# Patient Record
Sex: MALE | Race: WHITE | NOT HISPANIC OR LATINO | Employment: FULL TIME | URBAN - METROPOLITAN AREA
[De-identification: names, ages, dates, MRNs, and addresses within clinical notes are randomized per-mention and may not be internally consistent; named-entity substitution may affect disease eponyms.]

---

## 2022-09-12 ENCOUNTER — CONSULT (OUTPATIENT)
Dept: GASTROENTEROLOGY | Facility: CLINIC | Age: 37
End: 2022-09-12
Payer: COMMERCIAL

## 2022-09-12 ENCOUNTER — TELEPHONE (OUTPATIENT)
Dept: GASTROENTEROLOGY | Facility: CLINIC | Age: 37
End: 2022-09-12

## 2022-09-12 VITALS
WEIGHT: 221.2 LBS | DIASTOLIC BLOOD PRESSURE: 86 MMHG | BODY MASS INDEX: 29.96 KG/M2 | SYSTOLIC BLOOD PRESSURE: 128 MMHG | HEIGHT: 72 IN

## 2022-09-12 DIAGNOSIS — K21.9 GASTROESOPHAGEAL REFLUX DISEASE, UNSPECIFIED WHETHER ESOPHAGITIS PRESENT: ICD-10-CM

## 2022-09-12 DIAGNOSIS — R10.13 EPIGASTRIC PAIN: Primary | ICD-10-CM

## 2022-09-12 DIAGNOSIS — R10.11 RIGHT UPPER QUADRANT ABDOMINAL PAIN: ICD-10-CM

## 2022-09-12 PROCEDURE — 99203 OFFICE O/P NEW LOW 30 MIN: CPT | Performed by: NURSE PRACTITIONER

## 2022-09-12 RX ORDER — OMEPRAZOLE 20 MG/1
20 CAPSULE, DELAYED RELEASE ORAL DAILY
COMMUNITY
End: 2022-09-12 | Stop reason: ALTCHOICE

## 2022-09-12 RX ORDER — FAMOTIDINE 20 MG/1
20 TABLET, FILM COATED ORAL 2 TIMES DAILY
Qty: 30 TABLET | Refills: 6 | Status: SHIPPED | OUTPATIENT
Start: 2022-09-12 | End: 2022-10-03 | Stop reason: SDUPTHER

## 2022-09-12 RX ORDER — CALCIUM CARBONATE 750 MG/1
1 TABLET, CHEWABLE ORAL DAILY
COMMUNITY

## 2022-09-12 RX ORDER — PANTOPRAZOLE SODIUM 40 MG/1
40 TABLET, DELAYED RELEASE ORAL 2 TIMES DAILY WITH MEALS
COMMUNITY
Start: 2022-08-30 | End: 2022-10-03 | Stop reason: SDUPTHER

## 2022-09-12 NOTE — TELEPHONE ENCOUNTER
Scheduled date of EGD(as of today):10/03/22  Physician performing EGD:Dr Apollo Murray  Location of EGD:[facility]  Instructions reviewed with patient by:Dacia Jones Oats  Clearances: No

## 2022-09-12 NOTE — PROGRESS NOTES
[address] [facility] - Outpatient Consultation  Angeline Kee 30 y o  male MRN: [id number]  Encounter: [id number]    ASSESSMENT AND PLAN:      1  Epigastric pain  2  Right upper quadrant abdominal pain  3  Gastroesophageal reflux disease, unspecified whether esophagitis present  Patient referred by PCP for workup for possible gastritis  Patient had been prescribed pantoprazole 40 mg daily and omeprazole 20 mg daily  Patient states since starting with the medications his epigastric pain is much improved however states he continues with right upper quadrant pain, usually when he wakes up in the morning and during a m  Hours  Discussed increasing pantoprazole to 40 mg twice daily and stopping omeprazole as well as initiating Pepcid 20 mg HS  Consider GERD, functional dyspepsia, peptic versus gastric ulcer, gastritis, biliary dyskinesia  - CBC and differential; Future  - Comprehensive metabolic panel  - US right upper quadrant; Future  - CBC and differential  - EGD schedule at [facility])  - famotidine (PEPCID) 20 mg tablet; Take 1 tablet (20 mg total) by mouth 2 (two) times a day  Dispense: 30 tablet; Refill: 6      Followup Appointment:  3-4 weeks after procedure  ______________________________________________________________________    Chief Complaint   Patient presents with    Gastritis       HPI:   Angeline Kee is a 30y o  year old male referred by PCP for possible gastritis presents with epigastric and right upper quadrant abdominal discomfort  Patient states since starting on pantoprazole and omeprazole epigastric pain has resolved however states he does have occasional right upper quadrant pain which is routinely increased when he wakes up and in the a m  Hours  Patient denies nausea or vomiting  States his acid reflux symptoms are better since on meds  States he is having daily normal bowel movements  Denies hematochezia or melena  Nonsmoker, occasional ETOH use    Patient has not had a colonoscopy or EGD to date  Historical Information   Past Medical History:   Diagnosis Date    Gastritis      No past surgical history on file  Social History     Substance and Sexual Activity   Alcohol Use Yes    Comment: occasionally     Social History     Substance and Sexual Activity   Drug Use Never     Social History     Tobacco Use   Smoking Status Never Smoker   Smokeless Tobacco Never Used     Family History   Problem Relation Age of Onset    Anemia Mother     Esophageal cancer Father     No Known Problems Sister     Crohn's disease Paternal Uncle     Colon cancer Neg Hx     Colon polyps Neg Hx        Meds/Allergies     Current Outpatient Medications:     calcium carbonate (TUMS EX) 750 mg chewable tablet    famotidine (PEPCID) 20 mg tablet    pantoprazole (PROTONIX) 40 mg tablet    No Known Allergies    PHYSICAL EXAM:    Blood pressure 128/86, height 6' (1 829 m), weight 100 kg (221 lb 3 2 oz)  Body mass index is 30 kg/m²  General Appearance: NAD, cooperative, alert  Eyes: Anicteric, PERRLA, EOMI  ENT:  Normocephalic, atraumatic, normal mucosa  Neck:  Supple, symmetrical, trachea midline,   Resp:  Clear to auscultation bilaterally; no rales, rhonchi or wheezing; respirations unlabored   CV:  S1 S2, Regular rate and rhythm; no murmur, rub, or gallop  GI:  Soft, epigastric and right-sided abdominal discomfort with palpation, non-distended; normal bowel sounds; no masses, no organomegaly   Rectal: Deferred  Musculoskeletal: No cyanosis, clubbing or edema  Normal ROM    Skin:  No jaundice, rashes, or lesions   Heme/Lymph: No palpable cervical lymphadenopathy  Psych: Normal affect, good eye contact  Neuro: No gross deficits, AAOx3    Lab Results:   No results found for: WBC, HGB, HCT, MCV, PLT  No results found for: NA, K, CL, CO2, ANIONGAP, BUN, CREATININE, GLUCOSE, GLUF, CALCIUM, CORRECTEDCA, AST, ALT, ALKPHOS, PROT, BILITOT, EGFR  No results found for: IRON, TIBC, FERRITIN  No results found for: LIPASE    Radiology Results:   No results found  REVIEW OF SYSTEMS:    CONSTITUTIONAL: Denies any fever, chills, rigors, and weight loss  HEENT: No earache or tinnitus  Denies hearing loss or visual disturbances  CARDIOVASCULAR: No chest pain or palpitations  RESPIRATORY: Denies any cough, hemoptysis, shortness of breath or dyspnea on exertion  GASTROINTESTINAL: As noted in the History of Present Illness  GENITOURINARY: No problems with urination  Denies any hematuria or dysuria  NEUROLOGIC: No dizziness or vertigo, denies headaches  MUSCULOSKELETAL: Denies any muscle or joint pain  SKIN: Denies skin rashes or itching  ENDOCRINE: Denies excessive thirst  Denies intolerance to heat or cold  PSYCHOSOCIAL: Denies depression or anxiety  Denies any recent memory loss

## 2022-09-12 NOTE — PATIENT INSTRUCTIONS
Food diary  20-25 g of fiber per day  MiraLax, adjust for bowel movement every day or every other day    Increase head of bed  No eating 2-3 hours prior to bedtime

## 2022-09-13 LAB
ALBUMIN SERPL-MCNC: 5 G/DL (ref 4–5)
ALBUMIN/GLOB SERPL: 2.2 {RATIO} (ref 1.2–2.2)
ALP SERPL-CCNC: 68 IU/L (ref 44–121)
ALT SERPL-CCNC: 18 IU/L (ref 0–44)
AST SERPL-CCNC: 22 IU/L (ref 0–40)
BASOPHILS # BLD AUTO: 0.1 X10E3/UL (ref 0–0.2)
BASOPHILS NFR BLD AUTO: 1 %
BILIRUB SERPL-MCNC: 0.5 MG/DL (ref 0–1.2)
BUN SERPL-MCNC: 11 MG/DL (ref 6–20)
BUN/CREAT SERPL: 11 (ref 9–20)
CALCIUM SERPL-MCNC: 9.8 MG/DL (ref 8.7–10.2)
CHLORIDE SERPL-SCNC: 102 MMOL/L (ref 96–106)
CO2 SERPL-SCNC: 26 MMOL/L (ref 20–29)
CREAT SERPL-MCNC: 0.99 MG/DL (ref 0.76–1.27)
EGFR: 102 ML/MIN/1.73
EOSINOPHIL # BLD AUTO: 0.2 X10E3/UL (ref 0–0.4)
EOSINOPHIL NFR BLD AUTO: 3 %
ERYTHROCYTE [DISTWIDTH] IN BLOOD BY AUTOMATED COUNT: 11.8 % (ref 11.6–15.4)
GLOBULIN SER-MCNC: 2.3 G/DL (ref 1.5–4.5)
GLUCOSE SERPL-MCNC: 80 MG/DL (ref 65–99)
HCT VFR BLD AUTO: 45.1 % (ref 37.5–51)
HGB BLD-MCNC: 15.8 G/DL (ref 13–17.7)
IMM GRANULOCYTES # BLD: 0 X10E3/UL (ref 0–0.1)
IMM GRANULOCYTES NFR BLD: 0 %
LYMPHOCYTES # BLD AUTO: 2.2 X10E3/UL (ref 0.7–3.1)
LYMPHOCYTES NFR BLD AUTO: 31 %
MCH RBC QN AUTO: 31.7 PG (ref 26.6–33)
MCHC RBC AUTO-ENTMCNC: 35 G/DL (ref 31.5–35.7)
MCV RBC AUTO: 91 FL (ref 79–97)
MONOCYTES # BLD AUTO: 0.9 X10E3/UL (ref 0.1–0.9)
MONOCYTES NFR BLD AUTO: 13 %
NEUTROPHILS # BLD AUTO: 3.6 X10E3/UL (ref 1.4–7)
NEUTROPHILS NFR BLD AUTO: 52 %
PLATELET # BLD AUTO: 267 X10E3/UL (ref 150–450)
POTASSIUM SERPL-SCNC: 4.6 MMOL/L (ref 3.5–5.2)
PROT SERPL-MCNC: 7.3 G/DL (ref 6–8.5)
RBC # BLD AUTO: 4.98 X10E6/UL (ref 4.14–5.8)
SODIUM SERPL-SCNC: 143 MMOL/L (ref 134–144)
WBC # BLD AUTO: 6.9 X10E3/UL (ref 3.4–10.8)

## 2022-09-14 ENCOUNTER — HOSPITAL ENCOUNTER (OUTPATIENT)
Dept: ULTRASOUND IMAGING | Facility: HOSPITAL | Age: 37
Discharge: HOME | End: 2022-09-14
Payer: COMMERCIAL

## 2022-09-14 DIAGNOSIS — R10.13 EPIGASTRIC PAIN: ICD-10-CM

## 2022-09-14 PROCEDURE — 76705 ECHO EXAM OF ABDOMEN: CPT

## 2022-09-29 ENCOUNTER — TELEPHONE (OUTPATIENT)
Dept: GASTROENTEROLOGY | Facility: AMBULATORY SURGERY CENTER | Age: 37
End: 2022-09-29

## 2022-10-03 ENCOUNTER — HOSPITAL ENCOUNTER (OUTPATIENT)
Dept: GASTROENTEROLOGY | Facility: AMBULATORY SURGERY CENTER | Age: 37
Discharge: HOME | End: 2022-10-03
Payer: COMMERCIAL

## 2022-10-03 ENCOUNTER — ANESTHESIA (OUTPATIENT)
Dept: GASTROENTEROLOGY | Facility: AMBULATORY SURGERY CENTER | Age: 37
End: 2022-10-03

## 2022-10-03 ENCOUNTER — ANESTHESIA EVENT (OUTPATIENT)
Dept: GASTROENTEROLOGY | Facility: AMBULATORY SURGERY CENTER | Age: 37
End: 2022-10-03

## 2022-10-03 VITALS
HEART RATE: 64 BPM | WEIGHT: 206 LBS | HEIGHT: 72 IN | RESPIRATION RATE: 21 BRPM | DIASTOLIC BLOOD PRESSURE: 79 MMHG | OXYGEN SATURATION: 98 % | BODY MASS INDEX: 27.9 KG/M2 | SYSTOLIC BLOOD PRESSURE: 132 MMHG | TEMPERATURE: 98.1 F

## 2022-10-03 DIAGNOSIS — K21.9 GASTROESOPHAGEAL REFLUX DISEASE, UNSPECIFIED WHETHER ESOPHAGITIS PRESENT: ICD-10-CM

## 2022-10-03 DIAGNOSIS — R10.13 EPIGASTRIC PAIN: ICD-10-CM

## 2022-10-03 DIAGNOSIS — K29.70 GASTRITIS DETERMINED BY ENDOSCOPY: Primary | ICD-10-CM

## 2022-10-03 PROCEDURE — 43239 EGD BIOPSY SINGLE/MULTIPLE: CPT | Performed by: INTERNAL MEDICINE

## 2022-10-03 PROCEDURE — 88305 TISSUE EXAM BY PATHOLOGIST: CPT | Performed by: SPECIALIST

## 2022-10-03 RX ORDER — FAMOTIDINE 20 MG/1
20 TABLET, FILM COATED ORAL
Qty: 30 TABLET | Refills: 6 | Status: SHIPPED | OUTPATIENT
Start: 2022-10-03

## 2022-10-03 RX ORDER — PANTOPRAZOLE SODIUM 40 MG/1
40 TABLET, DELAYED RELEASE ORAL DAILY
Qty: 30 TABLET | Refills: 0 | Status: SHIPPED | OUTPATIENT
Start: 2022-10-03

## 2022-10-03 RX ORDER — SODIUM CHLORIDE, SODIUM LACTATE, POTASSIUM CHLORIDE, CALCIUM CHLORIDE 600; 310; 30; 20 MG/100ML; MG/100ML; MG/100ML; MG/100ML
50 INJECTION, SOLUTION INTRAVENOUS CONTINUOUS
Status: DISCONTINUED | OUTPATIENT
Start: 2022-10-03 | End: 2022-10-07 | Stop reason: HOSPADM

## 2022-10-03 RX ORDER — PROPOFOL 10 MG/ML
INJECTION, EMULSION INTRAVENOUS AS NEEDED
Status: DISCONTINUED | OUTPATIENT
Start: 2022-10-03 | End: 2022-10-03

## 2022-10-03 RX ADMIN — PROPOFOL 50 MG: 10 INJECTION, EMULSION INTRAVENOUS at 14:30

## 2022-10-03 RX ADMIN — PROPOFOL 150 MG: 10 INJECTION, EMULSION INTRAVENOUS at 14:27

## 2022-10-03 RX ADMIN — SODIUM CHLORIDE, SODIUM LACTATE, POTASSIUM CHLORIDE, CALCIUM CHLORIDE 50 ML/HR: 600; 310; 30; 20 INJECTION, SOLUTION INTRAVENOUS at 13:54

## 2022-10-03 NOTE — H&P
History and Physical - [facility]  Ludivina Mcgraw 30 y o  male MRN: [id number]    HPI: Ludivina Mcgraw is a 30y o  year old male who presents for epigastric pain    REVIEW OF SYSTEMS: Per the HPI, and otherwise unremarkable  Historical Information   Past Medical History:   Diagnosis Date    Gastritis      Past Surgical History:   Procedure Laterality Date    WISDOM TOOTH EXTRACTION       Social History   Social History     Substance and Sexual Activity   Alcohol Use Not Currently    Comment: occasionally     Social History     Substance and Sexual Activity   Drug Use Never     Social History     Tobacco Use   Smoking Status Never Smoker   Smokeless Tobacco Never Used     Family History   Problem Relation Age of Onset    Anemia Mother     Esophageal cancer Father     No Known Problems Sister     Crohn's disease Paternal Uncle     Colon cancer Neg Hx     Colon polyps Neg Hx        Meds/Allergies       Current Outpatient Medications:     calcium carbonate (TUMS EX) 750 mg chewable tablet    famotidine (PEPCID) 20 mg tablet    pantoprazole (PROTONIX) 40 mg tablet    Current Facility-Administered Medications:     lactated ringers infusion, 50 mL/hr, Intravenous, Continuous, 50 mL/hr at 10/03/22 1354    No Known Allergies    Objective     /85   Pulse 75   Temp 98 1 °F (36 7 °C) (Temporal)   Resp 20   Ht 6' (1 829 m)   Wt 93 4 kg (206 lb)   SpO2 100%   BMI 27 94 kg/m²     PHYSICAL EXAM    Gen: NAD AAOx3  Head: Normocephalic, Atraumatic  CV: S1S2 RRR no m/r/g  CHEST: Clear b/l no c/r/w  ABD: soft, +BS NT/ND  EXT: no edema    ASSESSMENT/PLAN:  This is a 30y o  year old male here for EGD, and he is stable and optimized for his procedure

## 2022-10-03 NOTE — ANESTHESIA PREPROCEDURE EVALUATION
Procedure:  EGD    Relevant Problems   Digestive   (+) Gastritis        Physical Exam    Airway    Mallampati score: II  TM Distance: >3 FB  Neck ROM: full     Dental   No notable dental hx     Cardiovascular      Pulmonary      Other Findings        Anesthesia Plan  ASA Score- 2     Anesthesia Type- IV sedation with anesthesia with ASA Monitors  Additional Monitors:   Airway Plan:           Plan Factors-Exercise tolerance (METS): >4 METS  Chart reviewed  Patient summary reviewed  Patient is not a current smoker  Induction- intravenous  Postoperative Plan-     Informed Consent- Anesthetic plan and risks discussed with patient  I personally reviewed this patient with the CRNA  Discussed and agreed on the Anesthesia Plan with the MARIO ALBERTO Nieves

## 2022-10-03 NOTE — ANESTHESIA POSTPROCEDURE EVALUATION
Post-Op Assessment Note    CV Status:  Stable  Pain Score: 0    Pain management: adequate     Mental Status:  Alert and awake   Hydration Status:  Euvolemic   PONV Controlled:  Controlled   Airway Patency:  Patent      Post Op Vitals Reviewed: Yes      Staff: Anesthesiologist, CRNA         No complications documented      BP   135/90   Temp      Pulse  72   Resp   22   SpO2   98

## 2022-10-11 ENCOUNTER — TELEPHONE (OUTPATIENT)
Dept: GASTROENTEROLOGY | Facility: CLINIC | Age: 37
End: 2022-10-11

## 2022-10-11 NOTE — TELEPHONE ENCOUNTER
----- Message from Raul Thibodeaux RN sent at 10/11/2022  4:11 PM EDT -----  Regarding: FW: Indigestion    ----- Message -----  From: Rayna Langley  Sent: 10/11/2022   4:10 PM EDT  To: , #  Subject: Indigestion                                      Hi Conrad Grimaldo,  For the past week and a half, I have been experiencing what I believe is indigestion in my throat/neck area  Typically right after meals or shortly after there is a burning feeling in that area  I have not had any other stomach, abdominal, or chest discomfort since our visit in mid September  I am still on a very strict clean diet  I am still taking one 40mg pantoprazole in the morning and one 20mg famotidine before bed  Do you have any suggestions? Thank you    Kallie Queen

## 2022-10-11 NOTE — TELEPHONE ENCOUNTER
Spoke to the patient about his recent concerns for increased acid reflux symptoms  He states he has been taking pantoprazole 40 mg daily and Pepcid 20 mg HS  The results of the biopsies had not been called to the patient however the patient did say he did notice them in his MyChart as of today  Appears biopsies may be negative  Instructed patient he can increase his pantoprazole to 40 mg twice daily until he receives his phone call from Dr Jasmin Messina who conducted his EGD and ask what he can do moving forward  He would prefer to have minimum amount of medications however noted if he is on maximum coverage for acid reflux and continuing to have an symptoms his future would most likely have an EGD with [facility]  Patient does have follow-up appointment in the next few weeks at the office

## 2022-10-13 NOTE — RESULT ENCOUNTER NOTE
Discussed with patient, no EGD recall  Spoke with nurse practitioner this week about ongoing symptoms  Agree with twice daily PPI and anti-reflux diet  Keep upcoming office visit

## 2022-10-26 ENCOUNTER — OFFICE VISIT (OUTPATIENT)
Dept: GASTROENTEROLOGY | Facility: CLINIC | Age: 37
End: 2022-10-26
Payer: COMMERCIAL

## 2022-10-26 VITALS
HEIGHT: 72 IN | BODY MASS INDEX: 27.09 KG/M2 | SYSTOLIC BLOOD PRESSURE: 120 MMHG | DIASTOLIC BLOOD PRESSURE: 80 MMHG | WEIGHT: 200 LBS

## 2022-10-26 DIAGNOSIS — K21.9 GASTROESOPHAGEAL REFLUX DISEASE, UNSPECIFIED WHETHER ESOPHAGITIS PRESENT: ICD-10-CM

## 2022-10-26 DIAGNOSIS — K82.8 GALLBLADDER SLUDGE: ICD-10-CM

## 2022-10-26 DIAGNOSIS — R10.13 EPIGASTRIC ABDOMINAL PAIN: Primary | ICD-10-CM

## 2022-10-26 PROCEDURE — 99213 OFFICE O/P EST LOW 20 MIN: CPT | Performed by: INTERNAL MEDICINE

## 2022-11-05 DIAGNOSIS — K21.9 GASTROESOPHAGEAL REFLUX DISEASE, UNSPECIFIED WHETHER ESOPHAGITIS PRESENT: ICD-10-CM

## 2022-11-05 RX ORDER — FAMOTIDINE 20 MG/1
TABLET, FILM COATED ORAL
Qty: 90 TABLET | Refills: 3 | Status: SHIPPED | OUTPATIENT
Start: 2022-11-05

## 2022-11-17 ENCOUNTER — HOSPITAL ENCOUNTER (OUTPATIENT)
Dept: NUCLEAR MEDICINE | Facility: HOSPITAL | Age: 37
End: 2022-11-17
Attending: INTERNAL MEDICINE

## 2022-11-17 DIAGNOSIS — K21.9 GASTROESOPHAGEAL REFLUX DISEASE, UNSPECIFIED WHETHER ESOPHAGITIS PRESENT: Primary | ICD-10-CM

## 2022-11-17 DIAGNOSIS — R10.13 EPIGASTRIC ABDOMINAL PAIN: ICD-10-CM

## 2022-11-17 RX ORDER — OMEPRAZOLE 40 MG/1
40 CAPSULE, DELAYED RELEASE ORAL DAILY
Qty: 90 CAPSULE | Refills: 1 | Status: SHIPPED | OUTPATIENT
Start: 2022-11-17

## 2022-11-17 RX ADMIN — SINCALIDE 1.8 MCG: 5 INJECTION, POWDER, LYOPHILIZED, FOR SOLUTION INTRAVENOUS at 08:47

## 2023-08-25 ENCOUNTER — OFFICE VISIT (OUTPATIENT)
Age: 38
End: 2023-08-25
Payer: COMMERCIAL

## 2023-08-25 VITALS
BODY MASS INDEX: 23.92 KG/M2 | HEIGHT: 72 IN | SYSTOLIC BLOOD PRESSURE: 128 MMHG | WEIGHT: 176.6 LBS | DIASTOLIC BLOOD PRESSURE: 82 MMHG

## 2023-08-25 DIAGNOSIS — K21.9 GASTROESOPHAGEAL REFLUX DISEASE WITHOUT ESOPHAGITIS: Primary | ICD-10-CM

## 2023-08-25 PROCEDURE — 99214 OFFICE O/P EST MOD 30 MIN: CPT | Performed by: INTERNAL MEDICINE

## 2023-08-25 RX ORDER — ESOMEPRAZOLE MAGNESIUM 40 MG/1
40 CAPSULE, DELAYED RELEASE ORAL
Qty: 180 CAPSULE | Refills: 0 | Status: SHIPPED | OUTPATIENT
Start: 2023-08-25

## 2023-08-25 NOTE — PROGRESS NOTES
[facility] - Outpatient Follow-up Note  Jaime Vega 41 y.o. male MRN: [id number]  Encounter: [id number]    ASSESSMENT AND PLAN:      1. Gastroesophageal reflux disease without esophagitis  Due to the irregular Z-line found on endoscopy and his family history of esophageal adenocarcinoma in his father, I do believe he has reflux. His breakthrough symptoms on prior therapy happen at night, so I will prescribe Nexium 40 mg twice daily. We will reassess symptoms in 8 weeks, and if still symptomatic, I will proceed with pH impedance  - esomeprazole (NexIUM) 40 MG capsule; Take 1 capsule (40 mg total) by mouth 2 (two) times a day before meals  Dispense: 180 capsule; Refill: 0      Follow up appointment: 2 months  ______________________________________________________________________    Chief Complaint   Patient presents with   • Follow-up     Pt experienced acid reflux symptoms and meds worked for a while but he developed indigestion and gas. He also noted weight loss. Also feels an itching sensation in upper right abdomen after eating any fatty foods. He currently eats a very bland diet. Takes famotidine 20 mg as needed. HPI:   Patient is a 41 y.o. male with a significant PMH of family history of esophageal adenocarcinoma presenting for follow up regarding GERD. He has been given several antisecretory therapies over the last year or so that seemingly worked initially, but then he starts to develop breakthrough symptoms of belching and regurgitation. The symptoms of breakthrough usually occur at night. He has always been prescribed just daily PPI. At one point he was on famotidine at night, but it did not help. He denies dysphagia and early satiety as well as vomiting. He did have a work-up to rule out gallbladder disease, but this was normal.  He does not smoke. His endoscopy last October showed an irregular Z-line.   Biopsies of the esophagus and stomach were unremarkable. Historical Information   Past Medical History:   Diagnosis Date   • Gastritis      Past Surgical History:   Procedure Laterality Date   • WISDOM TOOTH EXTRACTION       Social History     Substance and Sexual Activity   Alcohol Use Not Currently    Comment: occasionally     Social History     Substance and Sexual Activity   Drug Use Never     Social History     Tobacco Use   Smoking Status Never   Smokeless Tobacco Never     Family History   Problem Relation Age of Onset   • Anemia Mother    • Esophageal cancer Father    • Cancer Father         Lower esophagus cancer   • No Known Problems Sister    • Crohn's disease Paternal Uncle    • Colon cancer Neg Hx    • Colon polyps Neg Hx          Current Outpatient Medications:   •  esomeprazole (NexIUM) 40 MG capsule  •  milk thistle 175 MG tablet  •  calcium carbonate (TUMS EX) 750 mg chewable tablet  No Known Allergies  Reviewed medications and allergies and updated as indicated    PHYSICAL EXAM:    Blood pressure 128/82, height 6' (1.829 m), weight 80.1 kg (176 lb 9.6 oz). Body mass index is 23.95 kg/m². General Appearance: NAD, cooperative, alert  Eyes: Anicteric  GI:  Soft, non-tender, non-distended; normal bowel sounds; no masses, no organomegaly   Rectal: Deferred  Musculoskeletal: No edema. Skin:  No jaundice    Lab Results:   Lab Results   Component Value Date    WBC 6.9 09/12/2022    HGB 15.8 09/12/2022    MCV 91 09/12/2022     09/12/2022     Lab Results   Component Value Date    K 4.6 09/12/2022     09/12/2022    CO2 26 09/12/2022    BUN 11 09/12/2022    CREATININE 0.99 09/12/2022    AST 22 09/12/2022    ALT 18 09/12/2022    EGFR 102 09/12/2022     No results found for: "IRON", "TIBC", "FERRITIN"  No results found for: "LIPASE"    Radiology Results:   No results found.

## 2023-10-25 ENCOUNTER — OFFICE VISIT (OUTPATIENT)
Age: 38
End: 2023-10-25
Payer: COMMERCIAL

## 2023-10-25 VITALS
HEIGHT: 72 IN | SYSTOLIC BLOOD PRESSURE: 128 MMHG | WEIGHT: 180.2 LBS | BODY MASS INDEX: 24.41 KG/M2 | DIASTOLIC BLOOD PRESSURE: 78 MMHG

## 2023-10-25 DIAGNOSIS — K21.9 GASTROESOPHAGEAL REFLUX DISEASE WITHOUT ESOPHAGITIS: Primary | ICD-10-CM

## 2023-10-25 PROCEDURE — 99214 OFFICE O/P EST MOD 30 MIN: CPT | Performed by: INTERNAL MEDICINE

## 2023-10-25 NOTE — PROGRESS NOTES
[facility] - Outpatient Follow-up Note  Jorge Patino 41 y.o. male MRN: [id number]  Encounter: [id number]    ASSESSMENT AND PLAN:      1. Gastroesophageal reflux disease without esophagitis  Patient is still experiencing belching and regurgitation and likely feeling nonacid reflux. I will study him with 24-hour pH impedance on his current therapy as well as esophageal manometry in anticipation of needing an antireflux surgery. I discussed in detail with him and his wife about his options including Nissen fundoplication, LINX, and TIF  - Espoh manometry/24hr ph; Future      Follow up appointment: For manometry and pH impedance  ______________________________________________________________________    Chief Complaint   Patient presents with    Follow-up     Pt is experiencing burning in his throat in the morning. He has a constant feeling he has to burp. Esomeprazole helped initial but no longer helping symptoms. Pt noted he is sleeping on an incline. HPI:   Patient is a 41 y.o. male with a significant PMH of GERD and family history of esophageal adenocarcinoma presenting for follow up regarding continued issues with belching and regurgitation. He initially did feel better on his esomeprazole 40 mg twice daily, and was asymptomatic for about 3 weeks, but his symptoms did return. His main symptom is regurgitation with some belching. He denies dysphagia and odynophagia. He is interested in an antireflux procedure.     Historical Information   Past Medical History:   Diagnosis Date    Gastritis     GERD (gastroesophageal reflux disease) Sep 2022     Past Surgical History:   Procedure Laterality Date    WISDOM TOOTH EXTRACTION       Social History     Substance and Sexual Activity   Alcohol Use Not Currently    Comment: occasionally     Social History     Substance and Sexual Activity   Drug Use Never     Social History     Tobacco Use   Smoking Status Never   Smokeless Tobacco Never     Family History   Problem Relation Age of Onset    Anemia Mother     Esophageal cancer Father     Cancer Father         Lower esophagus cancer    No Known Problems Sister     Crohn's disease Paternal Uncle     Colon cancer Neg Hx     Colon polyps Neg Hx          Current Outpatient Medications:     esomeprazole (NexIUM) 40 MG capsule    milk thistle 175 MG tablet    calcium carbonate (TUMS EX) 750 mg chewable tablet  No Known Allergies  Reviewed medications and allergies and updated as indicated    PHYSICAL EXAM:    Blood pressure 128/78, height 6' (1.829 m), weight 81.7 kg (180 lb 3.2 oz). Body mass index is 24.44 kg/m². General Appearance: NAD, cooperative, alert  Eyes: Anicteric  GI:  Soft, non-tender, non-distended; normal bowel sounds; no masses, no organomegaly   Rectal: Deferred  Musculoskeletal: No edema. Skin:  No jaundice    Lab Results:   Lab Results   Component Value Date    WBC 6.9 09/12/2022    HGB 15.8 09/12/2022    MCV 91 09/12/2022     09/12/2022     Lab Results   Component Value Date    K 4.6 09/12/2022     09/12/2022    CO2 26 09/12/2022    BUN 11 09/12/2022    CREATININE 0.99 09/12/2022    AST 22 09/12/2022    ALT 18 09/12/2022    EGFR 102 09/12/2022     No results found for: "IRON", "TIBC", "FERRITIN"  No results found for: "LIPASE"    Radiology Results:   No results found.

## 2023-11-07 ENCOUNTER — TELEPHONE (OUTPATIENT)
Dept: GASTROENTEROLOGY | Facility: AMBULATORY SURGERY CENTER | Age: 38
End: 2023-11-07

## 2023-11-09 ENCOUNTER — HOSPITAL ENCOUNTER (OUTPATIENT)
Dept: GASTROENTEROLOGY | Facility: AMBULATORY SURGERY CENTER | Age: 38
Discharge: HOME | End: 2023-11-09
Attending: INTERNAL MEDICINE

## 2023-11-09 VITALS
SYSTOLIC BLOOD PRESSURE: 133 MMHG | DIASTOLIC BLOOD PRESSURE: 80 MMHG | RESPIRATION RATE: 15 BRPM | OXYGEN SATURATION: 100 % | HEART RATE: 69 BPM

## 2023-11-09 DIAGNOSIS — K21.9 GASTROESOPHAGEAL REFLUX DISEASE WITHOUT ESOPHAGITIS: ICD-10-CM

## 2023-11-09 NOTE — PERIOPERATIVE NURSING NOTE
Patient brought in the room and explained the esophageal manometry procedure. After the confirmation of allergies, hurricaine one spray given via oral cavity  and  a transnasal insertion of the High Resolution esophageal manometry catheter was inserted via right nostril. Patient given water to drink during the insertion and once the catheter inserted pressure bands of both Upper esophageal sphincter  (UES) and Lower esophageal sphincter ( LES) were observed on the color contour. Patient instructed to take a deep breath to verify placement of the catheter, diaphragmatic pinch noted on inspiration. Catheter was secured to right cheek. Patient was assisted to supine position . Patient was instructed to relax  while acclimating the catheter for about 5 minutes. A 30 second baseline resting pressure was obtained to identify the UES and LES followed by a series of 10 liquid swallows using 5 cc room temperature normal saline to assess esophageal motility and bolus transit. Patient administered 10 viscous swallows using 5 cc viscous solution, 1 multiple rapid drink swallow using 2 cc room temperature normal saline given a total of 5 drinks. Patient instructed to sit up at the edge of the stretcher and given 5 upright liquid swallows using 5 cc room temperature normal saline and 1 rapid drink challenge using 200 cc room temperature water. At the end of the procedure the high resolution esophageal manometry catheter was removed from the nostril intact. PH probe inserted via right nostril and secured. Zephr recorder teachback performed and patient verbalized understanding. Patient instructed to return next day to have probe remove. Discharge instructions given and patient ambulated out of room in stable condition.

## 2023-11-15 DIAGNOSIS — K22.2 ESOPHAGOGASTRIC JUNCTION OUTFLOW OBSTRUCTION: Primary | ICD-10-CM

## 2023-12-01 ENCOUNTER — HOSPITAL ENCOUNTER (OUTPATIENT)
Dept: RADIOLOGY | Facility: HOSPITAL | Age: 38
End: 2023-12-01
Attending: INTERNAL MEDICINE
Payer: COMMERCIAL

## 2023-12-01 DIAGNOSIS — K22.2 ESOPHAGOGASTRIC JUNCTION OUTFLOW OBSTRUCTION: ICD-10-CM

## 2023-12-01 PROCEDURE — 74220 X-RAY XM ESOPHAGUS 1CNTRST: CPT

## 2023-12-22 ENCOUNTER — OFFICE VISIT (OUTPATIENT)
Age: 38
End: 2023-12-22
Payer: COMMERCIAL

## 2023-12-22 VITALS
DIASTOLIC BLOOD PRESSURE: 80 MMHG | SYSTOLIC BLOOD PRESSURE: 138 MMHG | HEIGHT: 72 IN | BODY MASS INDEX: 25.55 KG/M2 | WEIGHT: 188.6 LBS

## 2023-12-22 DIAGNOSIS — K22.2 ESOPHAGOGASTRIC JUNCTION OUTFLOW OBSTRUCTION: ICD-10-CM

## 2023-12-22 DIAGNOSIS — K21.9 GASTROESOPHAGEAL REFLUX DISEASE WITHOUT ESOPHAGITIS: Primary | ICD-10-CM

## 2023-12-22 PROCEDURE — 99214 OFFICE O/P EST MOD 30 MIN: CPT | Performed by: INTERNAL MEDICINE

## 2023-12-22 NOTE — PROGRESS NOTES
[facility] - Outpatient Follow-up Note  Tom Carrizales 38 y.o. male MRN: [id number]  Encounter: [id number]    ASSESSMENT AND PLAN:      1. Gastroesophageal reflux disease without esophagitis  Now that he is asymptomatic, I think this is more likely.  I have instructed him to decrease his Nexium to daily and the new year and keep me updated via the patient portal.  If his symptoms return or worsen, I will proceed with EGD with [facility] off therapy to prove that he has reflux disease before sending him to a surgeon    2. Esophagogastric junction outflow obstruction  Clinically irrelevant, as he has no dysphagia or chest pain, and his barium esophagram with tablet was completely normal      Follow up appointment: Via patient portal as above    _______________________      Chief Complaint   Patient presents with    Follow-up     Pt would like to discuss test results and treatment plan.        HPI:   Patient is a 38 y.o. male with a significant PMH of GERD presenting for follow up regarding reflux.  He had esophageal manometry that showed an EGJ outflow obstruction with normal esophageal motility, but barium esophagram with tablet was normal.  He denies dysphagia and odynophagia.  He actually feels quite well and has no symptoms.  He believes the twice a day Nexium has slowly taken care of everything and is quite happy.  His 24-hour pH impedance testing showed no acid reflux whatsoever while on Nexium twice daily.  His symptoms did not correlate at all with acid or nonacid reflux.    Historical Information   Past Medical History:   Diagnosis Date    Gastritis     GERD (gastroesophageal reflux disease) Sep 2022     Past Surgical History:   Procedure Laterality Date    WISDOM TOOTH EXTRACTION       Social History     Substance and Sexual Activity   Alcohol Use Not Currently    Comment: occasionally     Social History     Substance and Sexual Activity   Drug Use Never     Social History  "    Tobacco Use   Smoking Status Never    Passive exposure: Never   Smokeless Tobacco Never     Family History   Problem Relation Age of Onset    Anemia Mother     Esophageal cancer Father     Cancer Father         Lower esophagus cancer    No Known Problems Sister     Crohn's disease Paternal Uncle     Colon cancer Neg Hx     Colon polyps Neg Hx          Current Outpatient Medications:     esomeprazole (NexIUM) 40 MG capsule    calcium carbonate (TUMS EX) 750 mg chewable tablet    milk thistle 175 MG tablet  No Known Allergies  Reviewed medications and allergies and updated as indicated    PHYSICAL EXAM:    Blood pressure 138/80, height 6' (1.829 m), weight 85.5 kg (188 lb 9.6 oz). Body mass index is 25.58 kg/m².  General Appearance: NAD, cooperative, alert  Eyes: Anicteric  GI:  Soft, non-tender, non-distended; normal bowel sounds; no masses, no organomegaly   Rectal: Deferred  Musculoskeletal: No edema.  Skin:  No jaundice    Lab Results:   Lab Results   Component Value Date    WBC 6.9 09/12/2022    HGB 15.8 09/12/2022    MCV 91 09/12/2022     09/12/2022     Lab Results   Component Value Date    K 4.6 09/12/2022     09/12/2022    CO2 26 09/12/2022    BUN 11 09/12/2022    CREATININE 0.99 09/12/2022    AST 22 09/12/2022    ALT 18 09/12/2022    EGFR 102 09/12/2022     No results found for: \"IRON\", \"TIBC\", \"FERRITIN\"  No results found for: \"LIPASE\"    Radiology Results:   FL barium swallow    Result Date: 12/1/2023  Narrative: BARIUM SWALLOW-ESOPHAGRAM INDICATION:   K22.2: Esophageal obstruction. COMPARISON:  None IMAGES: 116 FLUOROSCOPY TIME:   0.8 minutes TECHNIQUE: The patient was given effervescent granules and barium by mouth and images of the esophagus were obtained. FINDINGS: The esophagus is normal in caliber.  Esophageal motility is normal and emptying of contrast from the esophagus is prompt.  No mucosal lesion, ulceration or evidence of fold thickening is seen. Gastroesophageal reflux was not " observed.  There is no hiatal hernia. Radiopaque pill was administered and passed freely through the esophagus into the stomach with no abnormal delay.     Impression: Unremarkable esophagram. Radiopaque pill was administered and passed freely through the esophagus into the stomach with no abnormal delay. Workstation performed: [id number]

## 2024-06-02 DIAGNOSIS — K21.9 GASTROESOPHAGEAL REFLUX DISEASE WITHOUT ESOPHAGITIS: ICD-10-CM

## 2024-06-03 RX ORDER — ESOMEPRAZOLE MAGNESIUM 40 MG/1
CAPSULE, DELAYED RELEASE ORAL
Qty: 180 CAPSULE | Refills: 1 | Status: SHIPPED | OUTPATIENT
Start: 2024-06-03

## 2024-12-15 DIAGNOSIS — K21.9 GASTROESOPHAGEAL REFLUX DISEASE WITHOUT ESOPHAGITIS: ICD-10-CM

## 2024-12-16 RX ORDER — ESOMEPRAZOLE MAGNESIUM 40 MG/1
CAPSULE, DELAYED RELEASE ORAL
Qty: 180 CAPSULE | Refills: 0 | Status: SHIPPED | OUTPATIENT
Start: 2024-12-16

## 2025-03-14 DIAGNOSIS — K21.9 GASTROESOPHAGEAL REFLUX DISEASE WITHOUT ESOPHAGITIS: ICD-10-CM

## 2025-03-14 RX ORDER — ESOMEPRAZOLE MAGNESIUM 40 MG/1
CAPSULE, DELAYED RELEASE ORAL
Qty: 180 CAPSULE | Refills: 0 | Status: SHIPPED | OUTPATIENT
Start: 2025-03-14

## 2025-03-26 ENCOUNTER — OFFICE VISIT (OUTPATIENT)
Age: 40
End: 2025-03-26
Payer: COMMERCIAL

## 2025-03-26 VITALS
BODY MASS INDEX: 29.26 KG/M2 | WEIGHT: 216 LBS | HEIGHT: 72 IN | DIASTOLIC BLOOD PRESSURE: 88 MMHG | SYSTOLIC BLOOD PRESSURE: 142 MMHG

## 2025-03-26 DIAGNOSIS — K21.9 GASTROESOPHAGEAL REFLUX DISEASE WITHOUT ESOPHAGITIS: ICD-10-CM

## 2025-03-26 DIAGNOSIS — K31.89 ESOPHAGOGASTRIC JUNCTION OUTFLOW OBSTRUCTION: ICD-10-CM

## 2025-03-26 DIAGNOSIS — R76.8 HEPATITIS C ANTIBODY POSITIVE IN BLOOD: Primary | ICD-10-CM

## 2025-03-26 DIAGNOSIS — K22.89 ESOPHAGOGASTRIC JUNCTION OUTFLOW OBSTRUCTION: ICD-10-CM

## 2025-03-26 PROCEDURE — 99214 OFFICE O/P EST MOD 30 MIN: CPT | Performed by: INTERNAL MEDICINE

## 2025-03-26 NOTE — PROGRESS NOTES
Name: Tom Carrizales      : 1985      MRN: [id number]  Encounter Provider: Freddie Nicole DO  Encounter Date: 3/26/2025   Encounter department: [facility] GASTROENTEROLOG[facility] [place]  :  Assessment & Plan  Hepatitis C antibody positive in blood  RNA levels negative, so no active infection.  Liver tests are normal.  No further workup or treatment needed.       Gastroesophageal reflux disease without esophagitis  Doing well on daily Nexium.  He does have breakthrough 1-2 times a week.  I did advise him that if it really bothers him, he can only take a second capsule.       Esophagogastric junction outflow obstruction  Clinically irrelevant as his barium with tablet is normal.  He continues to deny dysphagia           History of Present Illness   HPI  Tom Carrizales is a 40 y.o. male who presents in follow-up of for a new problem.  I have been following him for GERD for which she takes esomeprazole 40 mg daily.  He generally does a great job controlling his symptoms.  But 1 or 2 times a week he will get breakthrough.  But he is here for a new reason--he had blood work done because he and his wife are undergoing fertility workup and his hep C antibody was positive.  Liver tests are normal.  He has no idea how he may have been exposed, but his RNA viral load was negative.      Review of Systems       Objective   /88   Ht 6' (1.829 m)   Wt 98 kg (216 lb)   BMI 29.29 kg/m²      Physical Exam  General Appearance: NAD, cooperative, alert  Eyes: Anicteric  GI:  Soft, non-tender, non-distended; normal bowel sounds; no masses, no organomegaly   Rectal: Deferred  Musculoskeletal: No edema.  Skin:  No jaundice

## 2025-06-21 DIAGNOSIS — K21.9 GASTROESOPHAGEAL REFLUX DISEASE WITHOUT ESOPHAGITIS: ICD-10-CM

## 2025-06-23 RX ORDER — ESOMEPRAZOLE MAGNESIUM 40 MG/1
CAPSULE, DELAYED RELEASE ORAL
Qty: 180 CAPSULE | Refills: 0 | Status: SHIPPED | OUTPATIENT
Start: 2025-06-23